# Patient Record
Sex: MALE | Race: WHITE | NOT HISPANIC OR LATINO | Employment: FULL TIME | URBAN - METROPOLITAN AREA
[De-identification: names, ages, dates, MRNs, and addresses within clinical notes are randomized per-mention and may not be internally consistent; named-entity substitution may affect disease eponyms.]

---

## 2023-12-21 ENCOUNTER — OFFICE VISIT (OUTPATIENT)
Dept: URGENT CARE | Facility: CLINIC | Age: 58
End: 2023-12-21
Payer: COMMERCIAL

## 2023-12-21 VITALS
BODY MASS INDEX: 35.94 KG/M2 | HEART RATE: 55 BPM | HEIGHT: 74 IN | DIASTOLIC BLOOD PRESSURE: 110 MMHG | TEMPERATURE: 97 F | SYSTOLIC BLOOD PRESSURE: 182 MMHG | OXYGEN SATURATION: 97 % | RESPIRATION RATE: 16 BRPM | WEIGHT: 280 LBS

## 2023-12-21 DIAGNOSIS — L03.213 PRESEPTAL CELLULITIS OF LEFT EYE: Primary | ICD-10-CM

## 2023-12-21 DIAGNOSIS — R03.0 ELEVATED BLOOD PRESSURE READING IN OFFICE WITHOUT DIAGNOSIS OF HYPERTENSION: ICD-10-CM

## 2023-12-21 PROCEDURE — 99214 OFFICE O/P EST MOD 30 MIN: CPT | Mod: S$GLB,,, | Performed by: FAMILY MEDICINE

## 2023-12-21 PROCEDURE — 99214 PR OFFICE/OUTPT VISIT, EST, LEVL IV, 30-39 MIN: ICD-10-PCS | Mod: S$GLB,,, | Performed by: FAMILY MEDICINE

## 2023-12-21 RX ORDER — POLYMYXIN B SULFATE AND TRIMETHOPRIM 1; 10000 MG/ML; [USP'U]/ML
1 SOLUTION OPHTHALMIC EVERY 4 HOURS
Qty: 10 ML | Refills: 0 | Status: SHIPPED | OUTPATIENT
Start: 2023-12-21

## 2023-12-21 RX ORDER — AMOXICILLIN AND CLAVULANATE POTASSIUM 875; 125 MG/1; MG/1
1 TABLET, FILM COATED ORAL 2 TIMES DAILY
Qty: 20 TABLET | Refills: 0 | Status: SHIPPED | OUTPATIENT
Start: 2023-12-21 | End: 2023-12-31

## 2023-12-21 RX ORDER — ATORVASTATIN CALCIUM 10 MG/1
10 TABLET, FILM COATED ORAL
COMMUNITY
Start: 2023-12-11

## 2023-12-21 NOTE — PROGRESS NOTES
"Subjective:      Patient ID: Jonathan Zuñiga is a 58 y.o. male.    Vitals:  height is 6' 2" (1.88 m) and weight is 127 kg (280 lb). His oral temperature is 97.3 °F (36.3 °C). His blood pressure is 182/110 (abnormal) and his pulse is 55 (abnormal). His respiration is 16 and oxygen saturation is 97%.     Chief Complaint: Eye Problem    Pt states he has a red, itchy, slightly swollen left eye that started yesterday morning. Eye drops and cold compress were used to treat. He does wear contacts. He has some mild ey6e pain. He states it feels like something is in his eye    Eye Problem   The left eye is affected. This is a new problem. The current episode started yesterday. The pain is at a severity of 0/10. The patient is experiencing no pain. He Wears contacts. He has tried eye drops for the symptoms.     ROS   Objective:     Physical Exam   Constitutional: He is oriented to person, place, and time.   HENT:   Head: Normocephalic and atraumatic.   Nose: Nose normal.   Eyes: Pupils are equal, round, and reactive to light. Left eye exhibits discharge. Extraocular movement intact      Comments: Left eyelid is mildly red and swollen-worse on the upper than the lower lid.    fluorescein/proparacaine Hcl ophthalmic solution 0.25/0.5%  2 gtts instilled in left eye. No foreign body or abrasions noted, funduscopic exam appears normal   Neck: No neck rigidity present.   Abdominal: Normal appearance.   Neurological: no focal deficit. He is alert and oriented to person, place, and time.   Skin: Skin is warm and dry.   Psychiatric: His behavior is normal. Judgment and thought content normal.   Nursing note and vitals reviewed.      Assessment:     1. Preseptal cellulitis of left eye    2. Elevated blood pressure reading in office without diagnosis of hypertension      Vision Screening    Right eye Left eye Both eyes   Without correction 20/30 20/25 20/25   With correction          Plan:       Preseptal cellulitis of left eye  -     " amoxicillin-clavulanate 875-125mg (AUGMENTIN) 875-125 mg per tablet; Take 1 tablet by mouth 2 (two) times daily. for 10 days  Dispense: 20 tablet; Refill: 0  -     polymyxin B sulf-trimethoprim (POLYTRIM) 10,000 unit- 1 mg/mL Drop; Place 1 drop into both eyes every 4 (four) hours. For 5-7 days  Dispense: 10 mL; Refill: 0    Elevated blood pressure reading in office without diagnosis of hypertension    Pt or guardian provided educational materials and instructions regarding their visit diagnosis.